# Patient Record
Sex: MALE | Race: WHITE | Employment: PART TIME | URBAN - METROPOLITAN AREA
[De-identification: names, ages, dates, MRNs, and addresses within clinical notes are randomized per-mention and may not be internally consistent; named-entity substitution may affect disease eponyms.]

---
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
## 2020-01-15 ENCOUNTER — OFFICE VISIT (OUTPATIENT)
Dept: INTERNAL MEDICINE CLINIC | Facility: CLINIC | Age: 20
End: 2020-01-15
Payer: COMMERCIAL

## 2020-01-15 VITALS
WEIGHT: 152 LBS | HEIGHT: 70 IN | DIASTOLIC BLOOD PRESSURE: 71 MMHG | BODY MASS INDEX: 21.76 KG/M2 | OXYGEN SATURATION: 98 % | SYSTOLIC BLOOD PRESSURE: 130 MMHG | HEART RATE: 80 BPM

## 2020-01-15 DIAGNOSIS — L70.9 ACNE, UNSPECIFIED ACNE TYPE: Primary | ICD-10-CM

## 2020-01-15 PROCEDURE — 90732 PPSV23 VACC 2 YRS+ SUBQ/IM: CPT | Performed by: FAMILY MEDICINE

## 2020-01-15 PROCEDURE — 90472 IMMUNIZATION ADMIN EACH ADD: CPT | Performed by: FAMILY MEDICINE

## 2020-01-15 PROCEDURE — 90651 9VHPV VACCINE 2/3 DOSE IM: CPT | Performed by: FAMILY MEDICINE

## 2020-01-15 PROCEDURE — 90686 IIV4 VACC NO PRSV 0.5 ML IM: CPT | Performed by: FAMILY MEDICINE

## 2020-01-15 PROCEDURE — 90471 IMMUNIZATION ADMIN: CPT | Performed by: FAMILY MEDICINE

## 2020-01-15 PROCEDURE — 99213 OFFICE O/P EST LOW 20 MIN: CPT | Performed by: FAMILY MEDICINE

## 2020-01-15 RX ORDER — CLINDAMYCIN AND BENZOYL PEROXIDE 10; 50 MG/G; MG/G
1 GEL TOPICAL EVERY MORNING
Qty: 50 G | Refills: 1 | Status: SHIPPED | OUTPATIENT
Start: 2020-01-15 | End: 2020-02-17

## 2020-01-15 RX ORDER — DOXYCYCLINE 100 MG/1
100 CAPSULE ORAL DAILY
Qty: 30 CAPSULE | Refills: 1 | Status: SHIPPED | OUTPATIENT
Start: 2020-01-15 | End: 2020-02-17

## 2020-01-15 NOTE — PROGRESS NOTES
CC:  Rash Skin Problem (pt presents for skin irritation)      Hx of CC: Acne that recently started getting worse. No new triggers except for he is sleeping in a room with cats and unsure if there is an allergic component.   Tried topical and made worse acute distress  HEENT:  Normocephalic, supple. Moist mucus membranes.    Cardio:  RRR, no murmurs, S1, S2  Pulmonary:  Clear bilaterally, good air entry  Dermatologic:  Moderate inflammatory acne worse on cheeks   EXT: no edema  MS: normal movement   NEURO:

## 2020-02-17 DIAGNOSIS — L70.9 ACNE, UNSPECIFIED ACNE TYPE: ICD-10-CM

## 2020-02-17 RX ORDER — CLINDAMYCIN AND BENZOYL PEROXIDE 10; 50 MG/G; MG/G
1 GEL TOPICAL EVERY MORNING
Qty: 50 G | Refills: 1 | Status: SHIPPED | OUTPATIENT
Start: 2020-02-17 | End: 2021-02-11

## 2020-02-17 RX ORDER — DOXYCYCLINE 100 MG/1
100 CAPSULE ORAL DAILY
Qty: 30 CAPSULE | Refills: 1 | Status: SHIPPED | OUTPATIENT
Start: 2020-02-17 | End: 2020-05-09

## 2020-05-09 DIAGNOSIS — L70.9 ACNE, UNSPECIFIED ACNE TYPE: ICD-10-CM

## 2020-05-09 RX ORDER — DOXYCYCLINE 100 MG/1
100 CAPSULE ORAL DAILY
Qty: 30 CAPSULE | Refills: 1 | Status: SHIPPED | OUTPATIENT
Start: 2020-05-09

## 2020-05-13 ENCOUNTER — TELEPHONE (OUTPATIENT)
Dept: INTERNAL MEDICINE CLINIC | Facility: CLINIC | Age: 20
End: 2020-05-13

## 2020-05-13 ENCOUNTER — OFFICE VISIT (OUTPATIENT)
Dept: INTERNAL MEDICINE CLINIC | Facility: CLINIC | Age: 20
End: 2020-05-13
Payer: COMMERCIAL

## 2020-05-13 VITALS
HEIGHT: 70 IN | SYSTOLIC BLOOD PRESSURE: 126 MMHG | WEIGHT: 180 LBS | RESPIRATION RATE: 16 BRPM | BODY MASS INDEX: 25.77 KG/M2 | HEART RATE: 74 BPM | OXYGEN SATURATION: 98 % | DIASTOLIC BLOOD PRESSURE: 63 MMHG

## 2020-05-13 DIAGNOSIS — M54.50 ACUTE LEFT-SIDED LOW BACK PAIN WITHOUT SCIATICA: Primary | ICD-10-CM

## 2020-05-13 PROCEDURE — 99213 OFFICE O/P EST LOW 20 MIN: CPT | Performed by: FAMILY MEDICINE

## 2020-05-13 RX ORDER — METHYLPREDNISOLONE 4 MG/1
TABLET ORAL
Qty: 1 KIT | Refills: 0 | Status: SHIPPED | OUTPATIENT
Start: 2020-05-13 | End: 2020-12-09

## 2020-05-13 RX ORDER — HYDROCODONE BITARTRATE AND ACETAMINOPHEN 5; 325 MG/1; MG/1
1 TABLET ORAL EVERY 6 HOURS PRN
Qty: 50 TABLET | Refills: 0 | Status: SHIPPED | OUTPATIENT
Start: 2020-05-13 | End: 2020-05-20

## 2020-05-13 RX ORDER — CYCLOBENZAPRINE HCL 10 MG
10 TABLET ORAL 3 TIMES DAILY
Qty: 30 TABLET | Refills: 1 | Status: SHIPPED | OUTPATIENT
Start: 2020-05-13 | End: 2020-05-20

## 2020-05-13 NOTE — PROGRESS NOTES
CC:  Back Pain (Pt presents to clinic for back pain.)      Hx of CC:    Carrying something heavy at work  ( big piece of wood) > 50 lbs and pain happened all of the sudden  Tried 600 mg ibuprofen, flexeril and 1/2 norco at home with minimal relief  Pain 7/ fatigue, no weight changes  HEENT:  Denies congestion or nasal discharge  Cardio:  No chest pain   Pulmonary:  No cough, no SOB  GI:  No N/V/D  Dermatologic:  No rashes  MS: see above    Physical:    /63 (BP Location: Right arm, Patient Position: Sit

## 2020-05-13 NOTE — TELEPHONE ENCOUNTER
Maribel Cortes Batista: [id number] Need help?  Call us at [phone]   Status   Sent to Plan today   DrugNorco 5-325MG tablets   [facility]

## 2020-05-20 ENCOUNTER — TELEPHONE (OUTPATIENT)
Dept: INTERNAL MEDICINE CLINIC | Facility: CLINIC | Age: 20
End: 2020-05-20

## 2020-05-20 DIAGNOSIS — M54.50 ACUTE LEFT-SIDED LOW BACK PAIN WITHOUT SCIATICA: ICD-10-CM

## 2020-05-20 RX ORDER — CYCLOBENZAPRINE HCL 10 MG
10 TABLET ORAL 3 TIMES DAILY
Qty: 30 TABLET | Refills: 1 | Status: SHIPPED | OUTPATIENT
Start: 2020-05-20 | End: 2020-05-30

## 2020-05-20 RX ORDER — HYDROCODONE BITARTRATE AND ACETAMINOPHEN 5; 325 MG/1; MG/1
1 TABLET ORAL EVERY 6 HOURS PRN
Qty: 15 TABLET | Refills: 0 | Status: SHIPPED | OUTPATIENT
Start: 2020-05-20 | End: 2020-12-10

## 2020-06-01 NOTE — TELEPHONE ENCOUNTER
Patsy Chavez Key: [id number] Need help? Call us at [phone]   Outcome   Approved on May 27   Effective from 05/23/2020 through 08/23/2020.    DrugNorco 5-325MG tablets   [employer] [facility] [employer] [facility] Prescription Drug Authorization Form

## 2020-07-28 ENCOUNTER — APPOINTMENT (OUTPATIENT)
Dept: CT IMAGING | Facility: HOSPITAL | Age: 20
End: 2020-07-28
Attending: EMERGENCY MEDICINE
Payer: COMMERCIAL

## 2020-07-28 ENCOUNTER — HOSPITAL ENCOUNTER (EMERGENCY)
Facility: HOSPITAL | Age: 20
Discharge: HOME | End: 2020-07-28
Attending: EMERGENCY MEDICINE
Payer: COMMERCIAL

## 2020-07-28 ENCOUNTER — APPOINTMENT (OUTPATIENT)
Dept: GENERAL RADIOLOGY | Facility: HOSPITAL | Age: 20
End: 2020-07-28
Attending: EMERGENCY MEDICINE
Payer: COMMERCIAL

## 2020-07-28 VITALS
BODY MASS INDEX: 27.32 KG/M2 | OXYGEN SATURATION: 99 % | HEIGHT: 66 IN | HEART RATE: 68 BPM | DIASTOLIC BLOOD PRESSURE: 74 MMHG | SYSTOLIC BLOOD PRESSURE: 123 MMHG | WEIGHT: 170 LBS | RESPIRATION RATE: 16 BRPM | TEMPERATURE: 99 F
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
## 2020-07-28 PROCEDURE — 70450 CT HEAD/BRAIN W/O DYE: CPT | Performed by: EMERGENCY MEDICINE

## 2020-07-28 PROCEDURE — 72100 X-RAY EXAM L-S SPINE 2/3 VWS: CPT | Performed by: EMERGENCY MEDICINE

## 2020-07-28 PROCEDURE — 72125 CT NECK SPINE W/O DYE: CPT | Performed by: EMERGENCY MEDICINE

## 2020-07-28 PROCEDURE — 99284 EMERGENCY DEPT VISIT MOD MDM: CPT

## 2020-07-28 RX ORDER — IBUPROFEN 600 MG/1
600 TABLET ORAL EVERY 8 HOURS PRN
Qty: 30 TABLET | Refills: 0 | Status: SHIPPED | OUTPATIENT
Start: 2020-07-28 | End: 2020-08-04

## 2020-07-28 RX ORDER — HYDROCODONE BITARTRATE AND ACETAMINOPHEN 5; 325 MG/1; MG/1
1 TABLET ORAL ONCE
Status: COMPLETED | OUTPATIENT
Start: 2020-07-28 | End: 2020-07-28

## 2020-07-28 RX ORDER — LIDOCAINE 50 MG/G
1 PATCH TOPICAL EVERY 24 HOURS
Qty: 5 PATCH | Refills: 0 | Status: SHIPPED | OUTPATIENT
Start: 2020-07-28 | End: 2020-12-09
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
## 2020-07-28 NOTE — ED NOTES
Verbalized understanding of d/c instructions and appropriate follow-up information. Given copy of d/c papers. Ambulated out of er with steady gait with family.
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
## 2020-12-09 ENCOUNTER — OFFICE VISIT (OUTPATIENT)
Dept: INTERNAL MEDICINE CLINIC | Facility: CLINIC | Age: 20
End: 2020-12-09
Payer: COMMERCIAL

## 2020-12-09 VITALS
SYSTOLIC BLOOD PRESSURE: 123 MMHG | TEMPERATURE: 98 F | HEART RATE: 90 BPM | WEIGHT: 176 LBS | DIASTOLIC BLOOD PRESSURE: 74 MMHG | HEIGHT: 66 IN | OXYGEN SATURATION: 96 % | BODY MASS INDEX: 28.28 KG/M2

## 2020-12-09 DIAGNOSIS — R07.81 RIB PAIN ON LEFT SIDE: Primary | ICD-10-CM

## 2020-12-09 PROCEDURE — 3078F DIAST BP <80 MM HG: CPT | Performed by: FAMILY MEDICINE

## 2020-12-09 PROCEDURE — 3008F BODY MASS INDEX DOCD: CPT | Performed by: FAMILY MEDICINE

## 2020-12-09 PROCEDURE — 3074F SYST BP LT 130 MM HG: CPT | Performed by: FAMILY MEDICINE

## 2020-12-09 PROCEDURE — 99213 OFFICE O/P EST LOW 20 MIN: CPT | Performed by: FAMILY MEDICINE

## 2020-12-09 RX ORDER — HYDROCODONE BITARTRATE AND ACETAMINOPHEN 5; 325 MG/1; MG/1
1 TABLET ORAL EVERY 6 HOURS PRN
Qty: 5 TABLET | Refills: 0 | Status: SHIPPED | OUTPATIENT
Start: 2020-12-09 | End: 2020-12-09

## 2020-12-09 RX ORDER — HYDROCODONE BITARTRATE AND ACETAMINOPHEN 5; 325 MG/1; MG/1
1 TABLET ORAL EVERY 6 HOURS PRN
Qty: 5 TABLET | Refills: 0 | Status: SHIPPED | OUTPATIENT
Start: 2020-12-09 | End: 2020-12-10

## 2020-12-09 NOTE — PATIENT INSTRUCTIONS
Avoid heavy lifting    Stop smoking    Ibuprofen 600 mg every 6 hour    norco for severe pain if needed- not to take if driving/ working
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
## 2020-12-10 ENCOUNTER — TELEPHONE (OUTPATIENT)
Dept: INTERNAL MEDICINE CLINIC | Facility: CLINIC | Age: 20
End: 2020-12-10

## 2020-12-10 RX ORDER — HYDROCODONE BITARTRATE AND ACETAMINOPHEN 5; 325 MG/1; MG/1
1 TABLET ORAL EVERY 6 HOURS PRN
Qty: 5 TABLET | Refills: 0 | Status: SHIPPED | OUTPATIENT
Start: 2020-12-10

## 2021-05-01 ENCOUNTER — TELEPHONE (OUTPATIENT)
Dept: INTERNAL MEDICINE CLINIC | Facility: CLINIC | Age: 21
End: 2021-05-01
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
## 2021-05-01 RX ORDER — AMOXICILLIN AND CLAVULANATE POTASSIUM 875; 125 MG/1; MG/1
1 TABLET, FILM COATED ORAL 2 TIMES DAILY
Qty: 20 TABLET | Refills: 0 | Status: SHIPPED | OUTPATIENT
Start: 2021-05-01 | End: 2021-05-11

## 2021-05-01 NOTE — TELEPHONE ENCOUNTER
Patient's mom called. Patient is at work but has noticed that his hand is now a little red and getting swollen where his cat bit him last night. He can not leave work to go to the urgent care. Patient thinks his cat bit him overnight.  when he woke up [facility]

## 2021-09-27 ENCOUNTER — APPOINTMENT (OUTPATIENT)
Dept: GENERAL RADIOLOGY | Facility: HOSPITAL | Age: 21
End: 2021-09-27
Payer: COMMERCIAL

## 2021-09-27 ENCOUNTER — HOSPITAL ENCOUNTER (EMERGENCY)
Facility: HOSPITAL | Age: 21
Discharge: HOME | End: 2021-09-27
Payer: COMMERCIAL

## 2021-09-27 VITALS
OXYGEN SATURATION: 99 % | SYSTOLIC BLOOD PRESSURE: 116 MMHG | WEIGHT: 170 LBS | HEART RATE: 74 BPM | BODY MASS INDEX: 27 KG/M2 | TEMPERATURE: 98 F | RESPIRATION RATE: 18 BRPM | DIASTOLIC BLOOD PRESSURE: 68 MMHG

## 2021-09-27 DIAGNOSIS — S89.92XA LEFT KNEE INJURY, INITIAL ENCOUNTER: Primary | ICD-10-CM

## 2021-09-27 PROCEDURE — 99284 EMERGENCY DEPT VISIT MOD MDM: CPT

## 2021-09-27 PROCEDURE — 96372 THER/PROPH/DIAG INJ SC/IM: CPT

## 2021-09-27 PROCEDURE — 73560 X-RAY EXAM OF KNEE 1 OR 2: CPT

## 2021-09-27 PROCEDURE — 99283 EMERGENCY DEPT VISIT LOW MDM: CPT

## 2021-09-27 RX ORDER — KETOROLAC TROMETHAMINE 10 MG/1
10 TABLET, FILM COATED ORAL EVERY 6 HOURS PRN
Qty: 30 TABLET | Refills: 0 | Status: SHIPPED | OUTPATIENT
Start: 2021-09-27 | End: 2021-10-04

## 2021-09-27 RX ORDER — KETOROLAC TROMETHAMINE 30 MG/ML
15 INJECTION, SOLUTION INTRAMUSCULAR; INTRAVENOUS ONCE
Status: COMPLETED | OUTPATIENT
Start: 2021-09-27 | End: 2021-09-27

## 2021-09-27 NOTE — ED PROVIDER NOTES
Patient Seen in: [facility] AND [facility] Emergency Department      History   No chief complaint on file.     Stated Complaint: L knee injury    Subjective:   49-year-old male with no significant past medical history presenting with left knee injury that occurre Resp 09/27/21 1752 18   Temp 09/27/21 1752 98 °F (36.7 °C)   Temp src --    SpO2 09/27/21 1752 97 %   O2 Device 09/27/21 2005 None (Room air)       Current:/68   Pulse 74   Temp 98 °F (36.7 °C)   Resp 18   Wt 77.1 kg   SpO2 99%   BMI 27.44 kg/m² Reviewed - No data to display  XR KNEE (1 OR 2 VIEWS), LEFT (CPT=73560)    Result Date: 9/27/2021  PROCEDURE: XR KNEE (1 OR 2 VIEWS), LEFT (CPT=73560)  COMPARISON: None.   INDICATIONS: Generalized left knee pain and limited range of motion post twisting inj

## 2021-09-28 NOTE — ED QUICK NOTES
NP informed that patient is still at ED entrance, now requesting crutches. ER tech bringing crutches to the front entrance.

## 2021-09-28 NOTE — ED QUICK NOTES
Patient given discharge instructions and prescriptions sent to pharmacy. Patient verbalized understanding. Awaiting crutches to be sent from unit.

## 2021-09-28 NOTE — ED QUICK NOTES
Patient got in wheelchair and began wheeling himself out ED. This RN asked if he would like to wait for the crutches as they were on the way up, patient stated \"no, I got to fucking go. \" Patient then proceded to wheel himself to the entrance.

## 2021-09-29 NOTE — CM/SW NOTE
Attempted to contact patient with in network ortho. However in both attempts the voicemail was full. Unable to reach patient. ED CM will attempt again.

## 2021-09-30 NOTE — CM/SW NOTE
Spoke to the pt concerning ortho follow up and was going to provide him with an ortho Doc that takes his insurance, but he stated he already made a follow up appt with an ortho Doc.

## 2021-10-04 ENCOUNTER — TELEPHONE (OUTPATIENT)
Dept: INTERNAL MEDICINE CLINIC | Facility: CLINIC | Age: 21
End: 2021-10-04

## 2021-10-04 RX ORDER — HYDROCODONE BITARTRATE AND ACETAMINOPHEN 5; 325 MG/1; MG/1
1 TABLET ORAL EVERY 6 HOURS PRN
Qty: 20 TABLET | Refills: 0 | Status: SHIPPED | OUTPATIENT
Start: 2021-10-04

## 2021-10-04 NOTE — TELEPHONE ENCOUNTER
Discussed with mom. Patient has a buckle tear in his knee and is in a lot of pain. Saw ortho. planning to have surgery soon. I will prescribe a small amount of Norco.  Mom will monitor.

## 2021-10-04 NOTE — TELEPHONE ENCOUNTER
Pt called to request medication for severe left knee pain. States pain medication given at ER only makes his groggy/sleepy but does not alleviate pain. Please advise.

## 2021-10-25 NOTE — ED PROVIDER NOTES
Patient Seen in: [facility] AND [facility] Emergency Department    History   CC: testicular pain  HPI: Marivel Vickey 25year old male  who presents to the ER c/o left sided testicular pain and mild swelling x3 days. No penile dx. No rash.  No abd pain, n/v/d/c, uri and in NAD  Head - Appears symmetrical without deformity/swelling cranium, scalp, or facial bones  Eyes - PERRL, sclera not injected, no discharge noted, no periorbital edema  ENT - EAC bilaterally without discharge   Oropharynx clear, voice is clear  Neck scale and color duplex Doppler sonography.     FINDINGS:      RIGHT  TESTICLE: Measures 4.47 x 2.06 x 2.19 cm.  Normal echogenicity.  No masses.    EPIDIDYMIS: Normal size and echogenicity.    OTHER: Negative.  No varicocele or hydrocele.    DOPPLER: Miesha Stroud done

## 2023-03-31 ENCOUNTER — HOSPITAL ENCOUNTER (EMERGENCY)
Facility: HOSPITAL | Age: 23
Discharge: LEFT BEFORE DISCHARGE | End: 2023-03-31
Payer: COMMERCIAL

## 2023-03-31 ENCOUNTER — APPOINTMENT (OUTPATIENT)
Dept: GENERAL RADIOLOGY | Facility: HOSPITAL | Age: 23
End: 2023-03-31
Attending: EMERGENCY MEDICINE
Payer: COMMERCIAL

## 2023-03-31 VITALS
DIASTOLIC BLOOD PRESSURE: 86 MMHG | RESPIRATION RATE: 24 BRPM | HEART RATE: 112 BPM | BODY MASS INDEX: 24.99 KG/M2 | WEIGHT: 150 LBS | TEMPERATURE: 98 F | SYSTOLIC BLOOD PRESSURE: 129 MMHG | OXYGEN SATURATION: 100 % | HEIGHT: 65 IN

## 2023-03-31 DIAGNOSIS — R07.81 RIB PAIN: Primary | ICD-10-CM

## 2023-03-31 PROCEDURE — 99283 EMERGENCY DEPT VISIT LOW MDM: CPT

## 2023-03-31 PROCEDURE — 71111 X-RAY EXAM RIBS/CHEST4/> VWS: CPT | Performed by: EMERGENCY MEDICINE

## 2023-03-31 NOTE — ED QUICK NOTES
Patient left ER against medical advice. Patient walked out with friend after asking this RN repeatedly to help him stating \"I can't breathe, I feel like my throat is closing. I need an inhaler or something. \" Patient noted to be pacing around the room and refusing to wear mask. Patient educated that his chest x-ray is normal and his vital signs are stable as well. Patient continuing to state to this RN \"I need help to breathe. Everyone is concerned about my ribs but its my breathing that I need help with. \"  Provider Suhail Muhammad made aware that patient left against medical advice without signing any paperwork.
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
## 2023-04-01 ENCOUNTER — HOSPITAL ENCOUNTER (EMERGENCY)
Facility: HOSPITAL | Age: 23
Discharge: HOME | End: 2023-04-01
Attending: EMERGENCY MEDICINE
Payer: COMMERCIAL

## 2023-04-01 VITALS
WEIGHT: 150 LBS | DIASTOLIC BLOOD PRESSURE: 73 MMHG | HEIGHT: 66 IN | BODY MASS INDEX: 24.11 KG/M2 | SYSTOLIC BLOOD PRESSURE: 123 MMHG | HEART RATE: 78 BPM | TEMPERATURE: 98 F | OXYGEN SATURATION: 96 % | RESPIRATION RATE: 20 BRPM

## 2023-04-01 DIAGNOSIS — J02.0 STREP PHARYNGITIS: Primary | ICD-10-CM

## 2023-04-01 LAB — S PYO AG THROAT QL: POSITIVE

## 2023-04-01 PROCEDURE — 99283 EMERGENCY DEPT VISIT LOW MDM: CPT

## 2023-04-01 PROCEDURE — 87880 STREP A ASSAY W/OPTIC: CPT

## 2023-04-01 PROCEDURE — 99284 EMERGENCY DEPT VISIT MOD MDM: CPT

## 2023-04-01 RX ORDER — PENICILLIN V POTASSIUM 500 MG/1
500 TABLET ORAL 3 TIMES DAILY
Qty: 30 TABLET | Refills: 0 | Status: SHIPPED | OUTPATIENT
Start: 2023-04-01 | End: 2023-04-11
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
## 2023-05-08 ENCOUNTER — TELEPHONE (OUTPATIENT)
Dept: INTERNAL MEDICINE CLINIC | Facility: CLINIC | Age: 23
End: 2023-05-08

## 2023-05-08 NOTE — TELEPHONE ENCOUNTER
Mom concerned- pt was admitted for suicide attempt- was at [facility] but then discharged and patient refusing to follow up with psychiatry       Komal Olivas answered. Told him I was doing follow up call after being in hospital. He said he couldn't talk now but would call back or I could call him back later tonight.   Will retry

## 2023-06-14 ENCOUNTER — OFFICE VISIT (OUTPATIENT)
Dept: INTERNAL MEDICINE CLINIC | Facility: CLINIC | Age: 23
End: 2023-06-14
Payer: COMMERCIAL

## 2023-06-14 VITALS
OXYGEN SATURATION: 98 % | DIASTOLIC BLOOD PRESSURE: 82 MMHG | BODY MASS INDEX: 25.55 KG/M2 | WEIGHT: 159 LBS | HEIGHT: 66 IN | SYSTOLIC BLOOD PRESSURE: 130 MMHG | HEART RATE: 78 BPM

## 2023-06-14 DIAGNOSIS — F32.A DEPRESSION, UNSPECIFIED DEPRESSION TYPE: ICD-10-CM

## 2023-06-14 DIAGNOSIS — R05.9 COUGH, UNSPECIFIED TYPE: ICD-10-CM

## 2023-06-14 DIAGNOSIS — R07.9 CHEST PAIN, UNSPECIFIED TYPE: ICD-10-CM

## 2023-06-14 DIAGNOSIS — Z91.51 HISTORY OF ATTEMPTED SUICIDE: ICD-10-CM

## 2023-06-14 DIAGNOSIS — Z00.00 PHYSICAL EXAM: Primary | ICD-10-CM

## 2023-06-14 LAB
ALBUMIN SERPL-MCNC: 3.8 G/DL (ref 3.4–5)
ALBUMIN/GLOB SERPL: 1.3 {RATIO} (ref 1–2)
ALP LIVER SERPL-CCNC: 55 U/L
ALT SERPL-CCNC: 33 U/L
ANION GAP SERPL CALC-SCNC: 6 MMOL/L (ref 0–18)
AST SERPL-CCNC: 20 U/L (ref 15–37)
ATRIAL RATE: 83 BPM
BILIRUB SERPL-MCNC: 0.3 MG/DL (ref 0.1–2)
BUN BLD-MCNC: 15 MG/DL (ref 7–18)
BUN/CREAT SERPL: 19.7 (ref 10–20)
CALCIUM BLD-MCNC: 8.8 MG/DL (ref 8.5–10.1)
CHLORIDE SERPL-SCNC: 111 MMOL/L (ref 98–112)
CHOLEST SERPL-MCNC: 133 MG/DL (ref ?–200)
CO2 SERPL-SCNC: 25 MMOL/L (ref 21–32)
CREAT BLD-MCNC: 0.76 MG/DL
FASTING PATIENT LIPID ANSWER: NO
FASTING STATUS PATIENT QL REPORTED: NO
GFR SERPLBLD BASED ON 1.73 SQ M-ARVRAT: 130 ML/MIN/1.73M2 (ref 60–?)
GLOBULIN PLAS-MCNC: 2.9 G/DL (ref 2.8–4.4)
GLUCOSE BLD-MCNC: 84 MG/DL (ref 70–99)
HBV SURFACE AG SER-ACNC: <0.1 [IU]/L
HBV SURFACE AG SERPL QL IA: NONREACTIVE
HDLC SERPL-MCNC: 53 MG/DL (ref 40–59)
LDLC SERPL CALC-MCNC: 72 MG/DL (ref ?–100)
NONHDLC SERPL-MCNC: 80 MG/DL (ref ?–130)
OSMOLALITY SERPL CALC.SUM OF ELEC: 294 MOSM/KG (ref 275–295)
P AXIS: 64 DEGREES
P-R INTERVAL: 124 MS
POTASSIUM SERPL-SCNC: 4.3 MMOL/L (ref 3.5–5.1)
PROT SERPL-MCNC: 6.7 G/DL (ref 6.4–8.2)
Q-T INTERVAL: 360 MS
QRS DURATION: 86 MS
QTC CALCULATION (BEZET): 423 MS
R AXIS: 55 DEGREES
SODIUM SERPL-SCNC: 142 MMOL/L (ref 136–145)
T AXIS: 66 DEGREES
TRIGL SERPL-MCNC: 30 MG/DL (ref 30–149)
VENTRICULAR RATE: 83 BPM
VLDLC SERPL CALC-MCNC: 5 MG/DL (ref 0–30)

## 2023-06-14 PROCEDURE — 84443 ASSAY THYROID STIM HORMONE: CPT | Performed by: FAMILY MEDICINE

## 2023-06-14 PROCEDURE — 99395 PREV VISIT EST AGE 18-39: CPT | Performed by: FAMILY MEDICINE

## 2023-06-14 PROCEDURE — 86780 TREPONEMA PALLIDUM: CPT | Performed by: FAMILY MEDICINE

## 2023-06-14 PROCEDURE — 3008F BODY MASS INDEX DOCD: CPT | Performed by: FAMILY MEDICINE

## 2023-06-14 PROCEDURE — 93000 ELECTROCARDIOGRAM COMPLETE: CPT | Performed by: FAMILY MEDICINE

## 2023-06-14 PROCEDURE — 80061 LIPID PANEL: CPT | Performed by: FAMILY MEDICINE

## 2023-06-14 PROCEDURE — 87491 CHLMYD TRACH DNA AMP PROBE: CPT | Performed by: FAMILY MEDICINE

## 2023-06-14 PROCEDURE — 3079F DIAST BP 80-89 MM HG: CPT | Performed by: FAMILY MEDICINE

## 2023-06-14 PROCEDURE — 96127 BRIEF EMOTIONAL/BEHAV ASSMT: CPT | Performed by: FAMILY MEDICINE

## 2023-06-14 PROCEDURE — 87389 HIV-1 AG W/HIV-1&-2 AB AG IA: CPT | Performed by: FAMILY MEDICINE

## 2023-06-14 PROCEDURE — 87340 HEPATITIS B SURFACE AG IA: CPT | Performed by: FAMILY MEDICINE

## 2023-06-14 PROCEDURE — 3075F SYST BP GE 130 - 139MM HG: CPT | Performed by: FAMILY MEDICINE

## 2023-06-14 PROCEDURE — 87591 N.GONORRHOEAE DNA AMP PROB: CPT | Performed by: FAMILY MEDICINE

## 2023-06-14 PROCEDURE — 80053 COMPREHEN METABOLIC PANEL: CPT | Performed by: FAMILY MEDICINE

## 2023-06-14 NOTE — PATIENT INSTRUCTIONS
Dr [email or web address]  [address] [facility], [address] [place] [address] ~2.5 mi  [phone]        Here are others to try:  Dr. Yolanda Chand [phone]  Dr. Parul Gaspar [phone]  Dr. Minda Flynn in [facility] [phone]. Therapists:   Bernie Glez , [address] [place] [address]   [email or web address]  [phone]      Meredith Borjas to clarity: Contact Intake at [phone] ext [phone] or email us at [email or web address] to schedule an appointment at any of our locations ( they will check insurance for you)    [email or web address]  P) [phone]    [facility] for [facility]   [phone]  [email or web address]

## 2023-06-15 LAB
C TRACH DNA SPEC QL NAA+PROBE: NEGATIVE
N GONORRHOEA DNA SPEC QL NAA+PROBE: NEGATIVE
TSI SER-ACNC: 3.53 MIU/ML (ref 0.36–3.74)

## 2023-06-16 LAB — T PALLIDUM AB SER QL: NEGATIVE

## 2023-07-12 ENCOUNTER — TELEPHONE (OUTPATIENT)
Dept: INTERNAL MEDICINE CLINIC | Facility: CLINIC | Age: 23
End: 2023-07-12

## 2023-07-12 NOTE — TELEPHONE ENCOUNTER
Pt. Request Medical Records to be Release from:    [facility] [place]  [address] [id number]  Ph. # [phone]    TO:  DANIELLE Mccarthy  [address], [place].[address]    Faxed to Medical Records for processing.

## 2023-07-19 ENCOUNTER — TELEPHONE (OUTPATIENT)
Dept: INTERNAL MEDICINE CLINIC | Facility: CLINIC | Age: 23
End: 2023-07-19

## 2023-07-19 DIAGNOSIS — Z91.89 HISTORY OF DRUG OVERDOSE: Primary | ICD-10-CM

## 2023-07-19 NOTE — TELEPHONE ENCOUNTER
Received outside hospital records from overdose in early July. Recommend getting CBC, CMP and echo to follow-up on testing there. Notified mom Pamela Doss who will tell son.

## 2024-02-01 ENCOUNTER — OFFICE VISIT (OUTPATIENT)
Dept: FAMILY MEDICINE CLINIC | Facility: CLINIC | Age: 24
End: 2024-02-01
Payer: COMMERCIAL

## 2024-02-01 VITALS
BODY MASS INDEX: 27.16 KG/M2 | HEART RATE: 105 BPM | OXYGEN SATURATION: 98 % | DIASTOLIC BLOOD PRESSURE: 81 MMHG | RESPIRATION RATE: 16 BRPM | HEIGHT: 66 IN | WEIGHT: 169 LBS | SYSTOLIC BLOOD PRESSURE: 146 MMHG | TEMPERATURE: 99 F

## 2024-02-01 DIAGNOSIS — Z76.89 RETURN TO WORK EVALUATION: Primary | ICD-10-CM

## 2024-02-16 NOTE — PROGRESS NOTES
Subjective:   Patient ID: Andres Salas is a 24 year old male.    Nausea/vomiting (Sx Monday - Vomiting, sweats, fatigue  Denies, cough, ST, fever, ear pain, sinus pressure  No OTC)    Pt states that his symptoms have resolved but that he will need a note for work, to cover missed days and evaluation if he is ready to return to work.           History/Other:   Review of Systems   Constitutional:  Negative for chills, fatigue and fever.   HENT:  Negative for congestion, rhinorrhea and sore throat.    Respiratory:  Negative for cough.    Gastrointestinal:  Negative for nausea and vomiting.   All other systems reviewed and are negative.    No current outpatient medications on file.     Allergies:  Allergies   Allergen Reactions    Keflex [Cephalexin Monohydrate] SHORTNESS OF BREATH       Objective:   Physical Exam  Vitals and nursing note reviewed.   Constitutional:       Appearance: Normal appearance. He is not ill-appearing.   HENT:      Head: Normocephalic and atraumatic.      Right Ear: Tympanic membrane, ear canal and external ear normal.      Left Ear: Tympanic membrane, ear canal and external ear normal.      Nose: Nose normal.      Mouth/Throat:      Mouth: Mucous membranes are moist.   Eyes:      General: No scleral icterus.     Conjunctiva/sclera: Conjunctivae normal.   Cardiovascular:      Rate and Rhythm: Normal rate and regular rhythm.      Heart sounds: Normal heart sounds. No murmur heard.  Pulmonary:      Effort: Pulmonary effort is normal.      Breath sounds: Normal breath sounds. No wheezing.   Abdominal:      General: Abdomen is flat. Bowel sounds are normal.      Tenderness: There is no abdominal tenderness.   Skin:     General: Skin is warm and dry.   Neurological:      Mental Status: He is alert and oriented to person, place, and time.   Psychiatric:         Mood and Affect: Mood normal.         Behavior: Behavior normal.         Assessment & Plan:   1. Return to work evaluation      Physical  exam within normal limits, work note provided for return to work and missed days for viral illness.      Meds This Visit:  Requested Prescriptions      No prescriptions requested or ordered in this encounter       Imaging & Referrals:  None

## 2024-07-05 NOTE — TELEPHONE ENCOUNTER
Faxed.     Rx placed in envelope and placed at the nurses station for patient to  tomorrow morning. Rx is in locked base cabinet in nurses station. Patient is aware.

## 2025-01-28 ENCOUNTER — APPOINTMENT (OUTPATIENT)
Dept: CT IMAGING | Facility: HOSPITAL | Age: 25
End: 2025-01-28
Attending: EMERGENCY MEDICINE
Payer: COMMERCIAL

## 2025-01-28 PROCEDURE — 70450 CT HEAD/BRAIN W/O DYE: CPT | Performed by: EMERGENCY MEDICINE

## 2025-01-29 PROBLEM — R45.851 SUICIDAL IDEATION: Status: ACTIVE | Noted: 2025-01-29

## 2025-01-29 PROBLEM — R45.851 SUICIDAL IDEATIONS: Status: ACTIVE | Noted: 2025-01-29

## 2025-01-30 PROBLEM — F31.63 BIPOLAR DISORD, CRNT EPSD MIXED, SEVERE, W/O PSYCH FEATURES (HCC): Status: ACTIVE | Noted: 2025-01-30

## 2025-01-30 PROBLEM — F31.60 BIPOLAR AFFECTIVE, MIXED (HCC): Status: ACTIVE | Noted: 2025-01-30

## 2025-01-31 PROBLEM — F31.4 BIPOLAR DISORDER, CURRENT EPISODE DEPRESSED, SEVERE, WITHOUT PSYCHOTIC FEATURES (HCC): Status: ACTIVE | Noted: 2025-01-30

## 2025-07-05 ENCOUNTER — APPOINTMENT (OUTPATIENT)
Dept: CT IMAGING | Facility: HOSPITAL | Age: 25
End: 2025-07-05
Attending: EMERGENCY MEDICINE
Payer: COMMERCIAL

## 2025-07-05 ENCOUNTER — HOSPITAL ENCOUNTER (OUTPATIENT)
Age: 25
Discharge: ANOTHER HOSPITAL | End: 2025-07-05
Payer: COMMERCIAL

## 2025-07-05 ENCOUNTER — HOSPITAL ENCOUNTER (EMERGENCY)
Facility: HOSPITAL | Age: 25
Discharge: HOME | End: 2025-07-05
Attending: EMERGENCY MEDICINE
Payer: COMMERCIAL

## 2025-07-05 ENCOUNTER — APPOINTMENT (OUTPATIENT)
Dept: GENERAL RADIOLOGY | Facility: HOSPITAL | Age: 25
End: 2025-07-05
Payer: COMMERCIAL

## 2025-07-05 VITALS
SYSTOLIC BLOOD PRESSURE: 109 MMHG | HEART RATE: 56 BPM | TEMPERATURE: 98 F | DIASTOLIC BLOOD PRESSURE: 77 MMHG | OXYGEN SATURATION: 99 % | BODY MASS INDEX: 27 KG/M2 | WEIGHT: 170 LBS | RESPIRATION RATE: 17 BRPM

## 2025-07-05 VITALS
OXYGEN SATURATION: 97 % | RESPIRATION RATE: 18 BRPM | DIASTOLIC BLOOD PRESSURE: 63 MMHG | TEMPERATURE: 99 F | SYSTOLIC BLOOD PRESSURE: 112 MMHG | HEART RATE: 85 BPM

## 2025-07-05 DIAGNOSIS — R51.9 ACUTE NONINTRACTABLE HEADACHE, UNSPECIFIED HEADACHE TYPE: ICD-10-CM

## 2025-07-05 DIAGNOSIS — R07.9 CHEST PAIN OF UNCERTAIN ETIOLOGY: Primary | ICD-10-CM

## 2025-07-05 DIAGNOSIS — R07.89 CHEST PAIN, ATYPICAL: Primary | ICD-10-CM

## 2025-07-05 LAB
ALBUMIN SERPL-MCNC: 4.4 G/DL (ref 3.2–4.8)
ALBUMIN/GLOB SERPL: 1.9 {RATIO} (ref 1–2)
ALP LIVER SERPL-CCNC: 73 U/L (ref 45–117)
ALT SERPL-CCNC: 21 U/L (ref 10–49)
ANION GAP SERPL CALC-SCNC: 7 MMOL/L (ref 0–18)
AST SERPL-CCNC: 26 U/L (ref ?–34)
ATRIAL RATE: 75 BPM
BASOPHILS # BLD AUTO: 0.08 X10(3) UL (ref 0–0.2)
BASOPHILS NFR BLD AUTO: 1.2 %
BILIRUB SERPL-MCNC: 0.4 MG/DL (ref 0.3–1.2)
BUN BLD-MCNC: 12 MG/DL (ref 9–23)
CALCIUM BLD-MCNC: 10 MG/DL (ref 8.7–10.6)
CHLORIDE SERPL-SCNC: 106 MMOL/L (ref 98–112)
CO2 SERPL-SCNC: 28 MMOL/L (ref 21–32)
CREAT BLD-MCNC: 0.91 MG/DL (ref 0.7–1.3)
EGFRCR SERPLBLD CKD-EPI 2021: 120 ML/MIN/1.73M2 (ref 60–?)
EOSINOPHIL # BLD AUTO: 0.24 X10(3) UL (ref 0–0.7)
EOSINOPHIL NFR BLD AUTO: 3.6 %
ERYTHROCYTE [DISTWIDTH] IN BLOOD BY AUTOMATED COUNT: 12.9 %
GLOBULIN PLAS-MCNC: 2.3 G/DL (ref 2–3.5)
GLUCOSE BLD-MCNC: 96 MG/DL (ref 70–99)
HCT VFR BLD AUTO: 42.2 % (ref 39–53)
HGB BLD-MCNC: 14 G/DL (ref 13–17.5)
IMM GRANULOCYTES # BLD AUTO: 0.02 X10(3) UL (ref 0–1)
IMM GRANULOCYTES NFR BLD: 0.3 %
LYMPHOCYTES # BLD AUTO: 2.14 X10(3) UL (ref 1–4)
LYMPHOCYTES NFR BLD AUTO: 31.8 %
MCH RBC QN AUTO: 29.4 PG (ref 26–34)
MCHC RBC AUTO-ENTMCNC: 33.2 G/DL (ref 31–37)
MCV RBC AUTO: 88.7 FL (ref 80–100)
MONOCYTES # BLD AUTO: 0.51 X10(3) UL (ref 0.1–1)
MONOCYTES NFR BLD AUTO: 7.6 %
NEUTROPHILS # BLD AUTO: 3.75 X10 (3) UL (ref 1.5–7.7)
NEUTROPHILS # BLD AUTO: 3.75 X10(3) UL (ref 1.5–7.7)
NEUTROPHILS NFR BLD AUTO: 55.5 %
OSMOLALITY SERPL CALC.SUM OF ELEC: 292 MOSM/KG (ref 275–295)
P AXIS: 62 DEGREES
P-R INTERVAL: 122 MS
PLATELET # BLD AUTO: 178 10(3)UL (ref 150–450)
POTASSIUM SERPL-SCNC: 4.2 MMOL/L (ref 3.5–5.1)
PROT SERPL-MCNC: 6.7 G/DL (ref 5.7–8.2)
Q-T INTERVAL: 348 MS
QRS DURATION: 82 MS
QTC CALCULATION (BEZET): 388 MS
R AXIS: 31 DEGREES
RBC # BLD AUTO: 4.76 X10(6)UL (ref 4.3–5.7)
SODIUM SERPL-SCNC: 141 MMOL/L (ref 136–145)
T AXIS: 64 DEGREES
TROPONIN I SERPL HS-MCNC: <3 NG/L (ref ?–53)
VENTRICULAR RATE: 75 BPM
WBC # BLD AUTO: 6.7 X10(3) UL (ref 4–11)

## 2025-07-05 PROCEDURE — 84484 ASSAY OF TROPONIN QUANT: CPT | Performed by: EMERGENCY MEDICINE

## 2025-07-05 PROCEDURE — 84484 ASSAY OF TROPONIN QUANT: CPT

## 2025-07-05 PROCEDURE — 70450 CT HEAD/BRAIN W/O DYE: CPT | Performed by: EMERGENCY MEDICINE

## 2025-07-05 PROCEDURE — 99285 EMERGENCY DEPT VISIT HI MDM: CPT

## 2025-07-05 PROCEDURE — 71045 X-RAY EXAM CHEST 1 VIEW: CPT

## 2025-07-05 PROCEDURE — 85025 COMPLETE CBC W/AUTO DIFF WBC: CPT | Performed by: EMERGENCY MEDICINE

## 2025-07-05 PROCEDURE — 93010 ELECTROCARDIOGRAM REPORT: CPT

## 2025-07-05 PROCEDURE — 93005 ELECTROCARDIOGRAM TRACING: CPT

## 2025-07-05 PROCEDURE — 85025 COMPLETE CBC W/AUTO DIFF WBC: CPT

## 2025-07-05 PROCEDURE — 36415 COLL VENOUS BLD VENIPUNCTURE: CPT

## 2025-07-05 PROCEDURE — 80053 COMPREHEN METABOLIC PANEL: CPT

## 2025-07-05 PROCEDURE — 99284 EMERGENCY DEPT VISIT MOD MDM: CPT

## 2025-07-05 PROCEDURE — 99215 OFFICE O/P EST HI 40 MIN: CPT | Performed by: NURSE PRACTITIONER

## 2025-07-05 PROCEDURE — 80053 COMPREHEN METABOLIC PANEL: CPT | Performed by: EMERGENCY MEDICINE

## 2025-07-05 NOTE — ED PROVIDER NOTES
Patient Seen in: [facility]       The following individual(s) verbally consented to be recorded using ambient AI listening technology and understand that they can each withdraw their consent to this listening technology at any point by asking the clinician to turn off or pause the recording:    Patient name: Andres Salas        History  No chief complaint on file.    Stated Complaint: head and body ache    Subjective:   HPI     Andres Salas is a 25 year old male who presents with right-sided headache and chest pain following heavy alcohol use.    He has been experiencing a right-sided headache for approximately three days. The headache is intermittent and can become very strong. He has not taken any pain medication for this symptom. He recalls hitting his head at work two to three weeks ago     He reports experiencing chest pain that is centrally located and sometimes radiates to his shoulder. The chest pain is intermittent and resolves spontaneously without any specific intervention.  He is unsure what triggers the pain.  No vomiting is associated with the chest pain. He denies shortness of breath.    He has a history of heavy alcohol use, with a recent episode of heavy drinking lasting about a week. He stopped drinking three to four days ago, and his symptoms began two days after cessation. He acknowledges a past issue with alcohol and a recent relapse.  He is unsure if he could have fallen/hit his head during his recent alcohol binge.  He also has a history of heroin overdose approximately a year ago, which resulted in a heart attack and required resuscitation. He denies current heroin use and does not follow up with a cardiologist or have any ongoing medical treatment. He smokes.      Objective:     Past Medical History:    Anger    Depression    Self-mutilation              Past Surgical History:   Procedure Laterality Date    Fracture surgery      closed reduction of fractured wrist.                 Social History     Socioeconomic History    Marital status: Single   Tobacco Use    Smoking status: Every Day     Current packs/day: 2.00     Average packs/day: 2.0 packs/day for 25.5 years (51.0 ttl pk-yrs)     Types: Cigarettes     Start date: 2010    Smokeless tobacco: Never   Vaping Use    Vaping status: Unknown   Substance and Sexual Activity    Alcohol use: Yes     Alcohol/week: 21.0 standard drinks of alcohol     Types: 21 Cans of beer per week     Comment: He either has 3 beers daily or 4 shots of alcohol daily    Drug use: Yes     Types: Cannabis, Cocaine, benzodiazepines, Opiods     Comment: went on a \"reid\". He had been clean for 7weeks    Sexual activity: Yes     Partners: Female     Social Drivers of Health     Food Insecurity: No Food Insecurity (1/31/2025)    Food Insecurity     Food Insecurity: Never true   Recent Concern: Food Insecurity - Food Insecurity Present (12/24/2024)    Food Insecurity     Food Insecurity: Sometimes true   Transportation Needs: No Transportation Needs (1/31/2025)    Transportation Needs     Lack of Transportation: No   Housing Stability: Low Risk  (1/31/2025)    Housing Stability     Housing Instability: No              Review of Systems    Positive for stated complaint: head and body ache  Other systems are as noted in HPI.  Constitutional and vital signs reviewed.      All other systems reviewed and negative except as noted above.                  Physical Exam    ED Triage Vitals [07/05/25 1305]   /63   Pulse 85   Resp 18   Temp 98.7 °F (37.1 °C)   Temp src Oral   SpO2 97 %   O2 Device None (Room air)       Current Vitals:   Vital Signs  BP: 112/63  Pulse: 85  Resp: 18  Temp: 98.7 °F (37.1 °C)  Temp src: Oral    Oxygen Therapy  SpO2: 97 %  O2 Device: None (Room air)            Physical Exam  Vitals and nursing note reviewed.   Constitutional:       General: He is not in acute distress.     Appearance: Normal appearance. He is not ill-appearing or  toxic-appearing.   HENT:      Head: Normocephalic and atraumatic.      Right Ear: Tympanic membrane, ear canal and external ear normal. No hemotympanum.      Left Ear: Tympanic membrane, ear canal and external ear normal. No hemotympanum.      Nose: Nose normal.      Mouth/Throat:      Mouth: Mucous membranes are moist.      Pharynx: Oropharynx is clear.   Eyes:      Pupils: Pupils are equal, round, and reactive to light.      Comments: Pupils 4+ bilaterally   Cardiovascular:      Rate and Rhythm: Normal rate and regular rhythm.      Pulses: Normal pulses.   Pulmonary:      Effort: Pulmonary effort is normal. No respiratory distress.      Breath sounds: Normal breath sounds.      Comments: Lungs clear.  No adventitious lung sounds.  No distress.  No hypoxia.  Pulse ox 97% ra. Which is normal    Abdominal:      General: Abdomen is flat.      Palpations: Abdomen is soft.   Musculoskeletal:         General: No signs of injury. Normal range of motion.      Cervical back: Normal range of motion and neck supple.   Skin:     General: Skin is warm and dry.      Capillary Refill: Capillary refill takes less than 2 seconds.   Neurological:      General: No focal deficit present.      Mental Status: He is alert and oriented to person, place, and time.      GCS: GCS eye subscore is 4. GCS verbal subscore is 5. GCS motor subscore is 6.   Psychiatric:         Mood and Affect: Mood normal.         Behavior: Behavior normal.         Thought Content: Thought content normal.         Judgment: Judgment normal.         ED Course  Labs Reviewed - No data to display     MDM       Medical Decision Making  Differential diagnoses reflecting the complexity of care include: Headache, chest pain, arrhythmia, hyponatremia, hypomagnesemia, closed head injury, ICH  Patient with persistent headache and chest pain after recent alcohol binge  Unknown if the patient may have hit his head during the alcohol binge  Patient's partner states \"heart  attack after heroin overdose a year ago\"  Discussed limitations of the immediate care.  No brain imaging, magnesium testing available at this site.  Discussed the option of ED eval versus IC eval.  Patient agreeable to go to the ED for full evaluation.  The case was discussed with attending Dr Roa. They are in agreement with the plan of care.             Problems Addressed:  Acute nonintractable headache, unspecified headache type: acute illness or injury  Chest pain of uncertain etiology: acute illness or injury        Disposition and Plan     Clinical Impression:  1. Chest pain of uncertain etiology    2. Acute nonintractable headache, unspecified headache type         Disposition:  Ic to ed  7/5/2025  1:14 pm    Follow-up:  No follow-up provider specified.        Medications Prescribed:  Discharge Medication List as of 7/5/2025  1:16 PM                Supplementary Documentation:

## 2025-07-05 NOTE — ED PROVIDER NOTES
Patient Seen in: [facility] Emergency Department        History  Chief Complaint   Patient presents with    Headache     Dizzy and confused, Chest pain.     Chest Pain Angina     Stated Complaint:     Subjective:   HPI            Patient is a 25-year-old male with a history of bipolar disorder presenting for evaluation of chest pain and headache.  He reports he has been having the chest pain intermittently for the last several days.  Substernal insert radiates up towards his left shoulder.  Has not noticed any palliative or provocative factors.  He reports he is actually been having this episodically for a couple of years but this is the longest an episode like this is sort of lasted.  No shortness of breath or nausea with it.  No cough or recent illnesses.  He also had a right-sided headache for the last 3 days.  He does not recall any trauma although he was drinking heavily the night before it started and cannot rule out the possibility that he hit his head on something.  No mental status changes.      Objective:     Past Medical History:    Anger    Depression    Self-mutilation              Past Surgical History:   Procedure Laterality Date    Fracture surgery      closed reduction of fractured wrist.                No pertinent social history.                              Physical Exam    ED Triage Vitals [07/05/25 1436]   /76   Pulse 80   Resp 17   Temp (!) 78.2 °F (25.7 °C)   Temp src Temporal   SpO2 97 %   O2 Device None (Room air)       Current Vitals:   Vital Signs  BP: 122/76  Pulse: 80  Resp: 17  Temp: 98.3 °F (36.8 °C)  Temp src: Temporal    Oxygen Therapy  SpO2: 97 %  O2 Device: None (Room air)            Physical Exam  Vitals and nursing note reviewed.   Constitutional:       Appearance: He is well-developed.   HENT:      Head: Normocephalic and atraumatic.   Eyes:      Conjunctiva/sclera: Conjunctivae normal.      Pupils: Pupils are equal, round, and reactive to light.   Cardiovascular:       Rate and Rhythm: Normal rate and regular rhythm.      Heart sounds: Normal heart sounds.   Pulmonary:      Effort: Pulmonary effort is normal.      Breath sounds: Normal breath sounds.   Abdominal:      General: Bowel sounds are normal.      Palpations: Abdomen is soft.   Musculoskeletal:         General: Normal range of motion.      Cervical back: Normal range of motion and neck supple.   Skin:     General: Skin is warm and dry.   Neurological:      Mental Status: He is alert and oriented to person, place, and time.                 ED Course  Labs Reviewed   COMP METABOLIC PANEL (14) - Normal   TROPONIN I HIGH SENSITIVITY - Normal   CBC WITH DIFFERENTIAL WITH PLATELET   RAINBOW DRAW BLUE     EKG    Rate, intervals and axes as noted on EKG Report.  Rate: 75  Rhythm: Sinus Rhythm  Reading: Sinus rhythm.  No ST segment or T wave changes.  No old available for comparison             Heart Score:    HEART Score      Title      Criteria Score   Age: <45 Age Score: 0   History: Slightly Suspicious Hx Score: 0     EKG: Normal EKG Score: 0   HTN: No   Hypercholesterolemia: No   Atherosclerosis/PVD: No     DM: No   BMI>30kg/m2: No   Smoking: Yes   Family History: No         Other Risk Factor Score: 1             Lab Results   Component Value Date    TROPHS <3 07/05/2025           HEART Score: 1        Risk of adverse cardiac event is 0.9-1.7%              CT BRAIN OR HEAD (CPT=70450)  Result Date: 7/5/2025  CT BRAIN OR HEAD (CPT=70450) CLINICAL INDICATION: 25 years-old-Male; Headache, possible head trauma while intoxicated COMPARISON: CT scan head, without contrast, [facility] 1/28/2025 TECHNIQUE: CT of the brain was performed using axial slices from the base of the skull to the vertex without contrast including bone window settings after initial  films.  CT dose reduction techniques utilized.  FINDINGS:  There is no evidence of acute intracranial hemorrhage, mass, vascular territory acute infarction, or  extraaxial fluid on this noncontrast CT examination. No mass effect or midline shift is seen of the midline structures.  There is no hydrocephalus. The ventricular system appears to be symmetric and normal in size.  There is chronic sinusitis involving the left maxillary sinus which reveals complete opacification with high density material, similar in appearance to the previous study. Incidental small retention cyst within the floor of the right maxillary sinus noted. The bony calvarium appears to be intact.     IMPRESSION: No acute intracranial process. No significant interval changes. Please see further details above. Electronically Verified and Signed by Attending Radiologist: Tita Calderon MD 7/5/2025 4:13 PM Workstation: [id number]    XR CHEST AP PORTABLE  (CPT=71045)  Result Date: 7/5/2025  PROCEDURE: XR CHEST AP PORTABLE  (CPT=71045) INDICATIONS: CP COMPARISON: 3/31/2023. FINDINGS: Lung volumes are low normal. No new consolidation or pleural effusion. No pneumothorax. Heart and pulmonary vessels are normal caliber. Smooth mediastinal contours.     CONCLUSION: No evidence of active cardiopulmonary disease. Electronically Verified and Signed by Attending Radiologist: Kunal Norwood MD 7/5/2025 3:40 PM Workstation: [id number]                      [facility]     Pleasant 25-year-old male with history of bipolar disorder presenting for evaluation of chest pain as well as a headache as detailed above.  Chest pain has been intermittent for a few days.  His PERC criteria negative.  EKG is unremarkable.  Low suspicion for serious pathology but labs including troponin have been ordered.  As far as the headache, he does report he is a trial of migraines in the past but those are usually more frontal, this on the right side is little different than usual.  Questionable trauma as detailed above.  Will get a CT brain.  No mental status changes.    Updated 4:50 PM.  Workup is unremarkable.  Patient is comfortable going  home.      Past Medical History-bipolar    Differential diagnosis before testing included ACS, PE, pneumothorax    Co-morbidities that add to the complexity of management include: None    Testing ordered during this visit included labs, EKG, chest x-ray, CT brain    Radiographic images  I personally reviewed the radiographs and my individual interpretation shows no ICH or mass  I also reviewed the official reports that showed no ICH or mass            Disposition:        Discharge  I have discussed with the patient the results of test, differential diagnosis, treatment plan, warning signs and symptoms which should prompt immediate return.  They expressed understanding of these instructions and agrees to the following plan provided.  They were given written discharge instructions and agrees to return for any concerns and voiced understanding and all questions were answered.      Medical Decision Making      Disposition and Plan     Clinical Impression:  1. Chest pain, atypical    2. Acute nonintractable headache, unspecified headache type         Disposition:  Discharge  7/5/2025  4:51 pm    Follow-up:  Marifer Melgar MD  [address] [place] [id number]  [phone]    Follow up            Medications Prescribed:  Current Discharge Medication List                Supplementary Documentation:

## 2025-07-05 NOTE — ED INITIAL ASSESSMENT (HPI)
Pt presents to the IC with c/o a headache and chest pain. Pt reports hitting his head while working approx 3 weeks ago without LOC. Pt notes he has recently been binge drinking ETOH and it is possible he has fallen. Denies ETOH ingestion in the last 3 days.

## 2025-07-11 NOTE — PROGRESS NOTES
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  Is drinking alcohol    Denies depression right now  Went to therapy   Usually worse in winter      Allergies:  Allergies[1]   Current Meds:  Current Medications[2]     History:  Past Medical History[3]   Past Surgical History[4]   Family History[5]   Family Status   Relation Status    Fa     Mo Alive    Other (Not Specified)    Sis Alive    Bro Alive    MGMA Alive    MGFA Alive    PGMA     PGFA Alive    Sis Alive      Short Social Hx on File[6]         ROS:  General:  No fever, no fatigue, no weight changes  HEENT:  Denies congestion or nasal discharge  Cardio:  No chest pain   Pulmonary:  No cough, no SOB  GI:  + cramping and diarrhea   Dermatologic:  No rashes    Physical:    /70   Pulse 84   Ht 5' 7\" (1.702 m)   Wt 171 lb (77.6 kg)   SpO2 97%   BMI 26.78 kg/m²     General:  Alert, appropriate, no acute distress  HEENT:  Normocephalic, supple. Moist mucus membranes.  Cardio:  RRR, no murmurs, S1, S2  Pulmonary:  Clear bilaterally, good air entry  Dermatologic:  No rashes or lesions  EXT: no edema  MS: normal movement   NEURO: no gross deficits     CT head 22  IMPRESSION:     No acute intracranial process. No significant interval changes. Please see further details above.     Assessment and Plan:    1. Diarrhea, unspecified type  Likely related to clindamycin  We will have him stop this and switch to Augmentin to treat dental  infection  He needs to contact his dentist on Monday to tell her about this antibiotics switch and make sure ok from her perspective   [place] diet  Probiotics  If diarrhea not improving in few days recommend check C. Difficile  Contact me if any questions  - C. diff toxigenic PCR (OPT) [E]; Future    2. Dental infection  Will switch from clindamycin to Augmentin.  Contacted yennifer Castaneda to confirm the patient can tolerate penicillins.  He has been on Augmentin in the past.  Last prescribed 2021 Keflex allergy was found around age 11.  - amoxicillin clavulanate 875-125 MG Oral Tab; Take 1 tablet by mouth 2 (two) times daily for 10 days.  Dispense: 14 tablet; Refill: 0    3. Screening examination for STD (sexually transmitted disease)    - HIV Ag/Ab Combo; Future  - Hepatitis B Surface Antigen; Future  - Chlamydia/Gc Amplification; Future  - T Pallidum Screening [place]; Future    4. Chronic sinusitis, unspecified location  Discussed imaging found on CT head  No sinus complaints  Can use Flonase or see ENT if bothering him.  Antibiotic could clear up any residual sinus problems also    5. Physical exam    STD testing ordered.  Has had other annual labs this year  Recommend quitting smoking, marijuana and alcohol   Mood good per pt      6. Tobacco use     Advised complete smoking cessation  Pt counseled on smoking cessation for between 3-10 minutes. Discussed risks of smoking such as increased risk of cancers and heart disease. Discussed medication options as well as lifestyle modifications. Will readdress at next visit.  Declined medicine    Return in 12  months, or sooner if symptoms worsen or fail to improve.  Patient indicates understanding of the above recommendations and agrees to the above plan.  Reassurance and education provided.  Notified to call with any questions, complications, allergies, or worsening or changing symptoms as well as any side effects or complications from the treatments.  Red flags/ ER  precautions discussed.    Marifer Melgar MD  Diplomat of the American Board of Family Medicine  Diplomat of the American Board of Obesity Medicine        There are no diagnoses linked to this encounter.  None  Orders Placed This Encounter   Procedures    HIV Ag/Ab Combo     Standing Status:   Future     Expected Date:   7/12/2025     Expiration Date:   7/12/2026     Consent obtained?:   Yes     Release to patient:   Immediate    Hepatitis B Surface Antigen     Standing Status:   Future     Expected Date:   7/12/2025     Expiration Date:   7/12/2026     Release to patient:   Immediate    T Pallidum Screening [place]     Standing Status:   Future     Expected Date:   7/12/2025     Expiration Date:   7/12/2026     Release to patient:   Immediate              [1]   Allergies  Allergen Reactions    Keflex [Cephalexin Monohydrate] ANAPHYLAXIS and SHORTNESS OF BREATH   [2]   Current Outpatient Medications   Medication Sig Dispense Refill    amoxicillin clavulanate 875-125 MG Oral Tab Take 1 tablet by mouth 2 (two) times daily for 10 days. 14 tablet 0    multivitamin Oral Tab Take 1 tablet by mouth daily. (Patient not taking: Reported on 7/12/2025) 30 tablet 0    nicotine 21 MG/24HR Transdermal Patch 24 Hr Place 1 patch onto the skin daily. (Patient not taking: Reported on 7/12/2025)      nicotine polacrilex 4 MG Mouth/Throat Gum Take 1 each (4 mg total) by mouth every hour as needed for Smoking cessation. (Patient not taking: Reported on 7/12/2025)      Naloxone HCl (NARCAN) 4 MG/0.1ML Nasal Liquid 4 mg by Nasal route as needed. If patient remains unresponsive, repeat dose in other nostril 2-5 minutes after first dose. (Patient not taking: Reported on 7/12/2025) 1 kit 0   [3]   Past Medical History:   Anger    Depression    Self-mutilation   [4]   Past Surgical History:  Procedure Laterality Date    Fracture surgery      closed reduction of fractured wrist.   [5]   Family History  Problem Relation Age of Onset    Bipolar  Disorder Father     ADHD Father     Cancer Father     Anxiety Mother     Cancer Other     Heart Disease Sister     Bipolar Disorder Sister     High Blood Pressure Maternal Grandmother     Lung Disorder Maternal Grandfather     Cancer Paternal Grandmother     Stroke Paternal Grandfather     Circulatory Problems Paternal Grandfather    [6]   Social History  Socioeconomic History    Marital status: Single   Tobacco Use    Smoking status: Every Day     Current packs/day: 2.00     Average packs/day: 2.0 packs/day for 25.5 years (51.1 ttl pk-yrs)     Types: Cigarettes     Start date: 2010    Smokeless tobacco: Never   Vaping Use    Vaping status: Unknown   Substance and Sexual Activity    Alcohol use: Yes     Alcohol/week: 21.0 standard drinks of alcohol     Types: 21 Cans of beer per week     Comment: He either has 3 beers daily or 4 shots of alcohol daily    Drug use: Yes     Types: Cannabis, Cocaine, benzodiazepines, Opiods     Comment: went on a \"reid\". He had been clean for 7weeks    Sexual activity: Yes     Partners: Female     Social Drivers of Health     Food Insecurity: No Food Insecurity (1/31/2025)    Food Insecurity     Food Insecurity: Never true   Recent Concern: Food Insecurity - Food Insecurity Present (12/24/2024)    Food Insecurity     Food Insecurity: Sometimes true   Transportation Needs: No Transportation Needs (1/31/2025)    Transportation Needs     Lack of Transportation: No   Housing Stability: Low Risk  (1/31/2025)    Housing Stability     Housing Instability: No

## 2025-07-12 ENCOUNTER — OFFICE VISIT (OUTPATIENT)
Dept: INTERNAL MEDICINE CLINIC | Facility: CLINIC | Age: 25
End: 2025-07-12
Payer: COMMERCIAL

## 2025-07-12 VITALS
WEIGHT: 171 LBS | HEIGHT: 67 IN | SYSTOLIC BLOOD PRESSURE: 110 MMHG | BODY MASS INDEX: 26.84 KG/M2 | DIASTOLIC BLOOD PRESSURE: 70 MMHG | OXYGEN SATURATION: 97 % | HEART RATE: 84 BPM

## 2025-07-12 DIAGNOSIS — K04.7 DENTAL INFECTION: ICD-10-CM

## 2025-07-12 DIAGNOSIS — Z72.0 TOBACCO USE: ICD-10-CM

## 2025-07-12 DIAGNOSIS — R19.7 DIARRHEA, UNSPECIFIED TYPE: Primary | ICD-10-CM

## 2025-07-12 DIAGNOSIS — Z00.00 PHYSICAL EXAM: ICD-10-CM

## 2025-07-12 DIAGNOSIS — Z11.3 SCREENING EXAMINATION FOR STD (SEXUALLY TRANSMITTED DISEASE): ICD-10-CM

## 2025-07-12 DIAGNOSIS — J32.9 CHRONIC SINUSITIS, UNSPECIFIED LOCATION: ICD-10-CM

## (undated) NOTE — ED AVS SNAPSHOT
Ellyn Serranoa   MRN: [id number]    Department:  [facility] Emergency Department   Date of Visit:  2/3/2019           Disclosure     Insurance plans vary and the physician(s) referred by the ER may not be covered by your plan.  Please contact your CARE PHYSICIAN AT ONCE OR RETURN IMMEDIATELY TO THE EMERGENCY DEPARTMENT. If you have been prescribed any medication(s), please fill your prescription right away and begin taking the medication(s) as directed.   If you believe that any of the medications

## (undated) NOTE — ED AVS SNAPSHOT
[facility] Emergency Department    [address]     [place] [address]    Phone:  [phone]    Fax:  [phone] [facility]   MRN: [id number]    Department:  [facility] Emergency Department   Date of Visit:  6/3/2017 and Class Registration line at [phone] or find a doctor online by visiting [email or web address].    IF THERE IS ANY CHANGE OR WORSENING OF YOUR CONDITION, CALL YOUR PRIMARY CARE PHYSICIAN AT ONCE OR RETURN IMMEDIATELY TO [address] [place] [address].     If

## (undated) NOTE — LETTER
Date: 2/1/2024    Patient Name: Andres Salas          To Whom it may concern:    This letter has been written at the patient's request. The above patient was seen at the [facility] for treatment of a medical condition.    This patient should be excused from attending work/school from 1/29/2024 through 2/1/2024.    The patient may return to work/school on 2/2/2024 with the following limitations: symptom free for 24 hours.         Sincerely,    Latha Ross APRN

## (undated) NOTE — LETTER
Date & Time: 9/27/2021, 7:47 PM  Patient: Asif Salas  Encounter Provider(s):    COLBY Guevara       To Whom It May Concern:    Wagner Villavicencio was seen and treated in our department on 9/27/2021.  He should not return to work until [facility]

## (undated) NOTE — ED AVS SNAPSHOT
Marycruz Oscar   MRN: [id number]    Department:  [facility] Emergency Department   Date of Visit:  7/12/2018           Disclosure     Insurance plans vary and the physician(s) referred by the ER may not be covered by your plan.  Please contact your CARE PHYSICIAN AT ONCE OR RETURN IMMEDIATELY TO THE EMERGENCY DEPARTMENT. If you have been prescribed any medication(s), please fill your prescription right away and begin taking the medication(s) as directed.   If you believe that any of the medications

## (undated) NOTE — ED AVS SNAPSHOT
Zofiagalreynaldo Lopez   MRN: [id number]    Department:  [facility] Emergency Department   Date of Visit:  12/31/2017           Disclosure     Insurance plans vary and the physician(s) referred by the ER may not be covered by your plan.  Please contact you CARE PHYSICIAN AT ONCE OR RETURN IMMEDIATELY TO THE EMERGENCY DEPARTMENT. If you have been prescribed any medication(s), please fill your prescription right away and begin taking the medication(s) as directed.   If you believe that any of the medications

## (undated) NOTE — ED AVS SNAPSHOT
[facility] Emergency Department    [address]     [place] [address]    Phone:  [phone]    Fax:  [phone] [facility]   MRN: [id number]    Department:  [facility] Emergency Department   Date of Visit:  6/3/2017 covered by your plan. Please contact your insurance company to determine coverage and benefits available for follow-up care and referrals.       If you have difficulty scheduling your follow-up appointment as directed, please call our  at [phone]) If you believe that any of the medications or instructions on this list is different from what your Primary Care doctor has instructed you - please continue to take your medications as instructed by your Primary Care doctor until you can check with your do coverage. Patient [address] is a Federal Navigator program that can help with your Affordable Care Act coverage, as well as all types of Medicaid plans.   To get signed up and covered, please call [phone] and ask to get set up for an insuran

## (undated) NOTE — ED AVS SNAPSHOT
Adrian Mercado   MRN: [id number]    Department:  [facility] Emergency Department   Date of Visit:  10/2/2019           Disclosure     Insurance plans vary and the physician(s) referred by the ER may not be covered by your plan.  Please conta CARE PHYSICIAN AT ONCE OR RETURN IMMEDIATELY TO THE EMERGENCY DEPARTMENT. If you have been prescribed any medication(s), please fill your prescription right away and begin taking the medication(s) as directed.   If you believe that any of the medications

## (undated) NOTE — LETTER
Date: 5/13/2020    Patient Name: Viv Penn          To Whom it may concern: This letter has been written at the patient's request. The above patient was seen at the [facility] for treatment of a medical condition.     This patient

## (undated) NOTE — ED AVS SNAPSHOT
Jorge Li   MRN: [id number]    Department:  [facility] Emergency Department   Date of Visit:  6/27/2018           Disclosure     Insurance plans vary and the physician(s) referred by the ER may not be covered by your plan.  Please contact your CARE PHYSICIAN AT ONCE OR RETURN IMMEDIATELY TO THE EMERGENCY DEPARTMENT. If you have been prescribed any medication(s), please fill your prescription right away and begin taking the medication(s) as directed.   If you believe that any of the medications